# Patient Record
Sex: MALE | Race: OTHER | HISPANIC OR LATINO | URBAN - METROPOLITAN AREA
[De-identification: names, ages, dates, MRNs, and addresses within clinical notes are randomized per-mention and may not be internally consistent; named-entity substitution may affect disease eponyms.]

---

## 2020-04-15 ENCOUNTER — EMERGENCY (EMERGENCY)
Facility: HOSPITAL | Age: 40
LOS: 1 days | Discharge: HOME | End: 2020-04-15
Attending: EMERGENCY MEDICINE | Admitting: EMERGENCY MEDICINE
Payer: COMMERCIAL

## 2020-04-15 VITALS
DIASTOLIC BLOOD PRESSURE: 86 MMHG | TEMPERATURE: 99 F | RESPIRATION RATE: 18 BRPM | OXYGEN SATURATION: 100 % | SYSTOLIC BLOOD PRESSURE: 102 MMHG | HEART RATE: 96 BPM

## 2020-04-15 LAB
ALBUMIN SERPL ELPH-MCNC: 5.1 G/DL — HIGH (ref 3.3–5)
ALP SERPL-CCNC: 62 U/L — SIGNIFICANT CHANGE UP (ref 40–120)
ALT FLD-CCNC: 59 U/L — HIGH (ref 4–41)
ANION GAP SERPL CALC-SCNC: 11 MMO/L — SIGNIFICANT CHANGE UP (ref 7–14)
APPEARANCE UR: CLEAR — SIGNIFICANT CHANGE UP
AST SERPL-CCNC: 33 U/L — SIGNIFICANT CHANGE UP (ref 4–40)
BACTERIA # UR AUTO: NEGATIVE — SIGNIFICANT CHANGE UP
BASOPHILS # BLD AUTO: 0.01 K/UL — SIGNIFICANT CHANGE UP (ref 0–0.2)
BASOPHILS NFR BLD AUTO: 0.3 % — SIGNIFICANT CHANGE UP (ref 0–2)
BILIRUB SERPL-MCNC: 0.5 MG/DL — SIGNIFICANT CHANGE UP (ref 0.2–1.2)
BILIRUB UR-MCNC: NEGATIVE — SIGNIFICANT CHANGE UP
BLOOD UR QL VISUAL: NEGATIVE — SIGNIFICANT CHANGE UP
BUN SERPL-MCNC: 9 MG/DL — SIGNIFICANT CHANGE UP (ref 7–23)
CALCIUM SERPL-MCNC: 10.4 MG/DL — SIGNIFICANT CHANGE UP (ref 8.4–10.5)
CHLORIDE SERPL-SCNC: 101 MMOL/L — SIGNIFICANT CHANGE UP (ref 98–107)
CO2 SERPL-SCNC: 30 MMOL/L — SIGNIFICANT CHANGE UP (ref 22–31)
COLOR SPEC: YELLOW — SIGNIFICANT CHANGE UP
CREAT SERPL-MCNC: 0.88 MG/DL — SIGNIFICANT CHANGE UP (ref 0.5–1.3)
EOSINOPHIL # BLD AUTO: 0.03 K/UL — SIGNIFICANT CHANGE UP (ref 0–0.5)
EOSINOPHIL NFR BLD AUTO: 0.8 % — SIGNIFICANT CHANGE UP (ref 0–6)
GLUCOSE SERPL-MCNC: 103 MG/DL — HIGH (ref 70–99)
GLUCOSE UR-MCNC: NEGATIVE — SIGNIFICANT CHANGE UP
HCT VFR BLD CALC: 49.5 % — SIGNIFICANT CHANGE UP (ref 39–50)
HGB BLD-MCNC: 16.7 G/DL — SIGNIFICANT CHANGE UP (ref 13–17)
HYALINE CASTS # UR AUTO: NEGATIVE — SIGNIFICANT CHANGE UP
IMM GRANULOCYTES NFR BLD AUTO: 0.3 % — SIGNIFICANT CHANGE UP (ref 0–1.5)
KETONES UR-MCNC: NEGATIVE — SIGNIFICANT CHANGE UP
LEUKOCYTE ESTERASE UR-ACNC: NEGATIVE — SIGNIFICANT CHANGE UP
LYMPHOCYTES # BLD AUTO: 1.81 K/UL — SIGNIFICANT CHANGE UP (ref 1–3.3)
LYMPHOCYTES # BLD AUTO: 50.4 % — HIGH (ref 13–44)
MCHC RBC-ENTMCNC: 30.6 PG — SIGNIFICANT CHANGE UP (ref 27–34)
MCHC RBC-ENTMCNC: 33.7 % — SIGNIFICANT CHANGE UP (ref 32–36)
MCV RBC AUTO: 90.7 FL — SIGNIFICANT CHANGE UP (ref 80–100)
MONOCYTES # BLD AUTO: 0.33 K/UL — SIGNIFICANT CHANGE UP (ref 0–0.9)
MONOCYTES NFR BLD AUTO: 9.2 % — SIGNIFICANT CHANGE UP (ref 2–14)
NEUTROPHILS # BLD AUTO: 1.4 K/UL — LOW (ref 1.8–7.4)
NEUTROPHILS NFR BLD AUTO: 39 % — LOW (ref 43–77)
NITRITE UR-MCNC: NEGATIVE — SIGNIFICANT CHANGE UP
NRBC # FLD: 0 K/UL — SIGNIFICANT CHANGE UP (ref 0–0)
PH UR: 6.5 — SIGNIFICANT CHANGE UP (ref 5–8)
PLATELET # BLD AUTO: 268 K/UL — SIGNIFICANT CHANGE UP (ref 150–400)
PMV BLD: 9.9 FL — SIGNIFICANT CHANGE UP (ref 7–13)
POTASSIUM SERPL-MCNC: 4.7 MMOL/L — SIGNIFICANT CHANGE UP (ref 3.5–5.3)
POTASSIUM SERPL-SCNC: 4.7 MMOL/L — SIGNIFICANT CHANGE UP (ref 3.5–5.3)
PROT SERPL-MCNC: 8.4 G/DL — HIGH (ref 6–8.3)
PROT UR-MCNC: 30 — SIGNIFICANT CHANGE UP
RBC # BLD: 5.46 M/UL — SIGNIFICANT CHANGE UP (ref 4.2–5.8)
RBC # FLD: 12.6 % — SIGNIFICANT CHANGE UP (ref 10.3–14.5)
RBC CASTS # UR COMP ASSIST: SIGNIFICANT CHANGE UP (ref 0–?)
SODIUM SERPL-SCNC: 142 MMOL/L — SIGNIFICANT CHANGE UP (ref 135–145)
SP GR SPEC: 1.03 — SIGNIFICANT CHANGE UP (ref 1–1.04)
SQUAMOUS # UR AUTO: SIGNIFICANT CHANGE UP
UROBILINOGEN FLD QL: NORMAL — SIGNIFICANT CHANGE UP
WBC # BLD: 3.59 K/UL — LOW (ref 3.8–10.5)
WBC # FLD AUTO: 3.59 K/UL — LOW (ref 3.8–10.5)
WBC UR QL: SIGNIFICANT CHANGE UP (ref 0–?)

## 2020-04-15 PROCEDURE — 99283 EMERGENCY DEPT VISIT LOW MDM: CPT

## 2020-04-15 RX ORDER — ACETAMINOPHEN 500 MG
975 TABLET ORAL ONCE
Refills: 0 | Status: COMPLETED | OUTPATIENT
Start: 2020-04-15 | End: 2020-04-15

## 2020-04-15 RX ADMIN — Medication 975 MILLIGRAM(S): at 09:40

## 2020-04-15 NOTE — ED PROVIDER NOTE - NS ED ROS FT
CONST: + fevers, no chills  EYES: no pain, no vision changes  ENT: no sore throat, no ear pain, no change in hearing  CV: no chest pain, no leg swelling  RESP: + shortness of breath, + cough  ABD: + abdominal pain, no nausea, no vomiting, + diarrhea  : + dysuria, no flank pain, no hematuria  MSK: no back pain, no extremity pain  NEURO: no headache or additional neurologic complaints  HEME: no easy bleeding  SKIN:  no rash

## 2020-04-15 NOTE — ED ADULT TRIAGE NOTE - CHIEF COMPLAINT QUOTE
pt was Dx. with PNA 1 wk ago and taking antibx. pt stated symptoms started 3 wk ago and has diff. breading + diarrhea not able to eat.

## 2020-04-15 NOTE — ED PROVIDER NOTE - CLINICAL SUMMARY MEDICAL DECISION MAKING FREE TEXT BOX
39y M w/ no significant PMHx presenting with worsening sob and cough along with new-onset diarrhea x3 days w/ hx of pneumonia dx 1 week ago on Augmentin and Doxycycline. VSS, on PE lungs ctab, abdomen soft, mild ttp in epigastric region. Likely COVID etiology with worsening sob, fever, diarrhea. Will obtain basic labs, CXR, UA/UCx, will give Tylenol and reassess. Plan-likely discharge home with strict return precautions, and PO challenge. 39y M w/ no significant PMHx presenting with worsening sob and cough along with new-onset diarrhea x3 days w/ hx of pneumonia dx 1 week ago on Augmentin and Doxycycline. VSS, on PE lungs ctab, abdomen soft, mild ttp in epigastric region. Likely COVID etiology with worsening sob, fever, diarrhea. Will obtain basic labs, UA/UCx, will give Tylenol and reassess. Plan-likely discharge home with strict return precautions, and PO challenge.

## 2020-04-15 NOTE — ED PROVIDER NOTE - PHYSICAL EXAMINATION
Physical Exam:  Gen: NAD, non-toxic appearing, awake alert   Head: NCAT  HEENT: PEERL, normal conjunctiva, oral mucosa moist  Neck: No cervical lymphadenopathy   Lung: CTAB, no respiratory distress, no wheezes/rhonchi/rales B/L, speaking in full sentences  CV: RRR, no murmurs, rubs or gallops  Abd: soft, mild epigastric ttp, ND, no guarding, no rigidity, no rebound tenderness, no CVA tenderness   MSK: no visible deformities, ROM normal in UE/LE  Neuro: No focal sensory or motor deficits  Skin: Warm, well perfused, no rash, no leg swelling  Psych: normal affect, calm  ~Kaity Weathers D.O. -Resident

## 2020-04-15 NOTE — ED PROVIDER NOTE - OBJECTIVE STATEMENT
39y M w/ no significant PMHx presenting with worsening sob and cough along with new-onset diarrhea x3 days. Patient was diagnosed with Pneumonia at Urgent Care 6d ago on CXR, and was started on Augmentin (day 5/7) and Doxycycline (day 5/7). Patient states despite taking the antibiotics, he has been having worsening sob associated with coughing along with diarrhea and "gas-like" abdominal pain. Patient states he is tolerating PO, but has decreased appetite. Also endorsing fever that has persisted for 3 weeks, but denies Tylenol or Motrin use. Endorses +sick contact (mother) who was also diagnosed with pneumonia at Urgent Care. Also endorsing dysuria, but denies hematuria, penile discharge. Denies headache, chills, cp, n/v, hematochezia, melena rash. 39y M w/ no significant PMHx presenting with worsening sob and cough along with new-onset diarrhea x3 days. Patient was diagnosed with Pneumonia at Urgent Care 6d ago on CXR, and was started on Augmentin (day 5/7) and Doxycycline (day 5/7). Patient states despite taking the antibiotics, he has been having worsening sob associated with coughing along with diarrhea and "gas-like" abdominal pain. Patient states he is tolerating PO, but has decreased appetite. Also endorsing fever that has persisted for 3 weeks, but denies Tylenol or Motrin use. Endorses +sick contact (mother) who was also diagnosed with pneumonia at Urgent Care. Also endorsing dysuria, but denies hematuria, penile discharge. Denies headache, chills, cp, n/v, hematochezia, melena rash.    ID#[id number] Rowdy

## 2020-04-15 NOTE — ED PROVIDER NOTE - ADDITIONAL NOTES AND INSTRUCTIONS:
Patient has been instructed to stay home for 2 weeks to monitor his symptoms. He may return to work after two week isolation and after being re-evaluated by a doctor.

## 2020-04-15 NOTE — ED PROVIDER NOTE - ATTENDING CONTRIBUTION TO CARE
I performed a face to face bedside interview with patient regarding history of present illness, review of symptoms and past medical history. I completed an independent physical exam.  I have discussed patient's plan of care.   I agree with note as stated above, having amended the EMR as needed to reflect my findings. I have discussed the assessment and plan of care.  This includes during the time I functioned as the attending physician for this patient.  Attending Contribution to Care: agree with plan of resident. 39y M w/ no significant PMHx presenting with worsening sob and cough along with new-onset diarrhea x3 days. Patient was diagnosed with Pneumonia at Urgent Care 6d ago on CXR, and was started on Augmentin (day 5/7) and Doxycycline (day 5/7). Patient states despite taking the antibiotics, he has been having worsening sob associated with coughing along with diarrhea and "gas-like" abdominal pain. Patient states he is tolerating PO, but has decreased appetite. Also endorsing fever that has persisted for 3 weeks, but denies Tylenol or Motrin use. Endorses +sick contact (mother) who was also diagnosed with pneumonia at Urgent Care. Also endorsing dysuria, but denies hematuria, penile discharge. Denies headache, chills, cp, n/v, hematochezia, melena rash.well appearing, ambulatory, speaking full sentences, with no evidence of weakness.

## 2020-04-15 NOTE — ED PROVIDER NOTE - NSFOLLOWUPINSTRUCTIONS_ED_ALL_ED_FT
You were seen and evaluated in the Emergency Department for your viral upper respiratory-like, possibly COVID. As we are not testing patients for COVID that are stable for discharge, you should self-quarantine for presumed COVID. Remaining self-quarantined is crucial to limiting the spread of the virus.  You may also refer to the CDC website for more information: https://www.cdc.gov/coronavirus/2019-ncov/if-you-are-sick/steps-when-sick.html.      At this time your clinical evaluation and history do not demonstrate any acute, life-threatening medical conditions warranting emergent treatment. We strongly recommend you set up a follow up with your primary care doctor or with our Internal Medicine clinic (see contact information below).    [facility]  General Internal Medicine  [address] [place] [address]  Phone: [phone]    Continue to maintain oral hydration with plenty of fluids.  You may take Tylenol (Acetaminophen) and Motrin (Ibuprofen), every 8 hours with food (following the instructions on the medication insert information sheet for dosing) for fever control.  Do not exceed 3000 mg in 24 hours of acetaminophen (Tylenol).     Should you develop new or worsening symptoms including but not limited to chest pain, shortness of breath, abdominal pain, fevers, vomiting, diarrhea - please return to the ED for immediate evaluation.

## 2020-04-15 NOTE — ED PROVIDER NOTE - PROGRESS NOTE DETAILS
Tylenol given. UA negative. Strict return precautions and isolation instructions given. Patient verbalized understanding, patient stable for discharge.   Kaity Weathers D.O. (PGY-1)

## 2020-04-15 NOTE — ED ADULT NURSE NOTE - OBJECTIVE STATEMENT
Pt A+OX3, [place] speaking, c/o cough and SOB x3 weeks that's gotten worse and now has diarrhea x3 days.  Was diagnosed with PNA and is on Abx.  Labs obtained and sent as ordered.  #20g SL R AC placed.  Tylenol given as ordered

## 2020-04-15 NOTE — ED PROVIDER NOTE - PATIENT PORTAL LINK FT
You can access the FollowMyHealth Patient Portal offered by [facility] by registering at the following website: [email or web address]. By joining [employer]’s FollowMyHealth portal, you will also be able to view your health information using other applications (apps) compatible with our system.

## 2020-04-15 NOTE — ED PROVIDER NOTE - DISCHARGE DATE
15-Apr-2020
Patient c/o of one episode of chest pain at around 0830, lasting a few seconds and has since resolved.

## 2020-04-16 LAB
CULTURE RESULTS: NO GROWTH — SIGNIFICANT CHANGE UP
SPECIMEN SOURCE: SIGNIFICANT CHANGE UP